# Patient Record
(demographics unavailable — no encounter records)

---

## 2025-01-21 NOTE — PHYSICAL EXAM
[Chaperone Present] : A chaperone was present in the examining room during all aspects of the physical examination [[id number]] : A chaperone was present during the pelvic exam. [Appropriately responsive] : appropriately responsive [Alert] : alert [No Acute Distress] : no acute distress [Soft] : soft [Non-tender] : non-tender [Non-distended] : non-distended [No HSM] : No HSM [No Lesions] : no lesions [No Mass] : no mass [Oriented x3] : oriented x3 [Labia Majora] : normal [Labia Minora] : normal [Discharge] : a  ~M vaginal discharge was present [Scant] : scant [Clear] : clear [Frothy] : frothy [Normal] : normal [Uterine Adnexae] : normal [FreeTextEntry2] : MAN Field [Foul Smelling] : not foul smelling

## 2025-01-21 NOTE — PLAN
[FreeTextEntry1] : 32yo here for vaginal itching - possibly yeast infection of vulva vs dry skin? not lichenified - rx clotrimazole w BMZ for symptomatic relief, RTC if persistent itching - sent pap/HPV, will f/u results - sent vaginitis panel, will f/u results - start PNV - return in 1 year for annual or PRN

## 2025-01-21 NOTE — HISTORY OF PRESENT ILLNESS
[Patient reported PAP Smear was normal] : Patient reported PAP Smear was normal [N] : Patient does not use contraception [Y] : Positive pregnancy history [Pregnancy History] : Vaginal delivery [Regular Cycle Intervals] : periods have been regular [Frequency: Q ___ days] : menstrual periods occur approximately every [unfilled] days [Menarche Age: ____] : age at menarche was [unfilled] [Currently Active] : currently active [Men] : men [Vaginal] : vaginal [No] : No [Patient refuses STI testing] : Patient refuses STI testing [FreeTextEntry1] : 34yo  LMP 24 here for vaginal itching. LMP 24  [facility] a [facility], went to urgent care turned out to be BV and was prescribed abx; other symptoms resolved but still having itching  last saw GYN   OBHx:  VD FT, short cervix was on vaginal progesterone  VD, FT, unc SAB , needed a D&C TOP w D&C  GynHx: menarche age 12, q28m, normal bleeding pap  believes it was normal hx of chlamydia s/p tx long time again sexually active w men, 1 partner, not new not on contraception, wouldn't mind if got pregnant again but not planning PMH: asthma as a child PSH: D&C x2 FmHx: maternal grandmother w breast cancer dx 70, paternal grandmother w stomach cancer dx 60s, was a smoker and drinker (both grandmothers) Social: denies T/E/D, social EtOH use works as  for [employer] Allergies: NKDA Meds: none  [___] : No pregnancy complications reported [PapSmeardate] : 2022 [PGHxTotal] : 4 [[facility]] : 2 [PGHxAbortions] : 1 [[facility] [PGHxABSpont] : 1